# Patient Record
Sex: FEMALE | Race: WHITE | NOT HISPANIC OR LATINO | Employment: UNEMPLOYED | URBAN - METROPOLITAN AREA
[De-identification: names, ages, dates, MRNs, and addresses within clinical notes are randomized per-mention and may not be internally consistent; named-entity substitution may affect disease eponyms.]

---

## 2019-11-03 ENCOUNTER — HOSPITAL ENCOUNTER (EMERGENCY)
Facility: MEDICAL CENTER | Age: 34
End: 2019-11-03
Attending: EMERGENCY MEDICINE
Payer: MEDICAID

## 2019-11-03 ENCOUNTER — APPOINTMENT (OUTPATIENT)
Dept: RADIOLOGY | Facility: MEDICAL CENTER | Age: 34
End: 2019-11-03
Attending: EMERGENCY MEDICINE
Payer: MEDICAID

## 2019-11-03 VITALS
HEIGHT: 62 IN | TEMPERATURE: 96.8 F | SYSTOLIC BLOOD PRESSURE: 125 MMHG | WEIGHT: 149.03 LBS | RESPIRATION RATE: 16 BRPM | OXYGEN SATURATION: 99 % | DIASTOLIC BLOOD PRESSURE: 79 MMHG | BODY MASS INDEX: 27.43 KG/M2 | HEART RATE: 76 BPM

## 2019-11-03 DIAGNOSIS — R07.89 CHEST WALL PAIN: ICD-10-CM

## 2019-11-03 PROCEDURE — A9270 NON-COVERED ITEM OR SERVICE: HCPCS | Performed by: EMERGENCY MEDICINE

## 2019-11-03 PROCEDURE — 700102 HCHG RX REV CODE 250 W/ 637 OVERRIDE(OP): Performed by: EMERGENCY MEDICINE

## 2019-11-03 PROCEDURE — 71101 X-RAY EXAM UNILAT RIBS/CHEST: CPT | Mod: LT

## 2019-11-03 PROCEDURE — 99284 EMERGENCY DEPT VISIT MOD MDM: CPT

## 2019-11-03 RX ORDER — OXYCODONE HYDROCHLORIDE AND ACETAMINOPHEN 5; 325 MG/1; MG/1
2 TABLET ORAL EVERY 6 HOURS PRN
Qty: 13 TAB | Refills: 0 | Status: SHIPPED | OUTPATIENT
Start: 2019-11-03 | End: 2019-11-06

## 2019-11-03 RX ORDER — PROMETHAZINE HYDROCHLORIDE 25 MG/1
25 TABLET ORAL EVERY 6 HOURS PRN
COMMUNITY

## 2019-11-03 RX ORDER — OXYCODONE HYDROCHLORIDE AND ACETAMINOPHEN 5; 325 MG/1; MG/1
2 TABLET ORAL ONCE
Status: COMPLETED | OUTPATIENT
Start: 2019-11-03 | End: 2019-11-03

## 2019-11-03 RX ADMIN — OXYCODONE HYDROCHLORIDE AND ACETAMINOPHEN 2 TABLET: 5; 325 TABLET ORAL at 17:04

## 2019-11-03 NOTE — ED TRIAGE NOTES
"Mari Celaya  Chief Complaint   Patient presents with   • Rib Pain     left side,  increased with inspiration and coughing     Pt ambulatory to triage with above complaint.  Elevated HR noted, per pt \"it's always high\", no acute distress.    Pt states left upper rib pain x 1 week, recently with \"bad cough\", and  Yesterday leaned over and \"felt a pop\", and has had increased pain.     Pt returned to lobby, educated on triage process, and to inform staff of any changes or concerns.     "

## 2019-11-04 NOTE — ED PROVIDER NOTES
ED Provider Note    CHIEF COMPLAINT  Chief Complaint   Patient presents with   • Rib Pain     left side,  increased with inspiration and coughing       HPI  Mari Celaya is a 34 y.o. female who presents with a chief complaint of rib pain is on the left side.  Duration has been for about 3 weeks got worse the last week and then yesterday while she is cleaning her up and she felt a sudden pop with worsening pain is about 9 attendance on the left side right above the left rib it is nonradiating so she takes deep breath no associated fever no chills she had a cough.    Patient states that she is has a history of celiac disease only taken Tylenol.  She states she has been taking it for pain.  Patient states that she is also starting a new job tomorrow and that she is unsure if she can work for her job.  She also goes to nighttime school.  She states that she been using her arms during school and this makes it difficult    REVIEW OF SYSTEMS  General: No fever or chills.  Eyes: No eye discharge. No eye pain.  Ear nose throat: No sore throat or  trouble swallowing.  Pulmonary: No shortness of breath or cough.  Cardiovascular: See above        PAST MEDICAL HISTORY  Past Medical History:   Diagnosis Date   • Anxiety    • Celiac disease        FAMILY HISTORY  History reviewed. No pertinent family history.    SOCIAL HISTORY  Social History     Socioeconomic History   • Marital status: Single     Spouse name: Not on file   • Number of children: Not on file   • Years of education: Not on file   • Highest education level: Not on file   Occupational History   • Not on file   Social Needs   • Financial resource strain: Not on file   • Food insecurity:     Worry: Not on file     Inability: Not on file   • Transportation needs:     Medical: Not on file     Non-medical: Not on file   Tobacco Use   • Smoking status: Current Every Day Smoker     Packs/day: 0.50     Types: Cigarettes   • Smokeless tobacco: Never Used   Substance and  "Sexual Activity   • 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   • Drug use: No   • Sexual activity: Not on file   Lifestyle   • Physical activity:     Days per week: Not on file     Minutes per session: Not on file   • Stress: Not on file   Relationships   • Social connections:     Talks on phone: Not on file     Gets together: Not on file     Attends Mandaeism service: Not on file     Active member of club or organization: Not on file     Attends meetings of clubs or organizations: Not on file     Relationship status: Not on file   • Intimate partner violence:     Fear of current or ex partner: Not on file     Emotionally abused: Not on file     Physically abused: Not on file     Forced sexual activity: Not on file   Other Topics Concern   • Not on file   Social History Narrative   • Not on file       SURGICAL HISTORY  History reviewed. No pertinent surgical history.    CURRENT MEDICATIONS  Home Medications     Reviewed by Cindy Arriola R.N. (Registered Nurse) on 11/03/19 at 1533  Med List Status: Partial   Medication Last Dose Status   omeprazole (PRILOSEC) 20 MG delayed-release capsule  Active   omeprazole (PRILOSEC) 20 MG delayed-release capsule  Active   oxyCODONE CR (OXYCONTIN) 10 MG T12A  Active   ranitidine (ZANTAC) 150 MG TABS  Active   ranitidine (ZANTAC) 300 MG tablet  Active                ALLERGIES  Allergies   Allergen Reactions   • Pertussis Vaccines        PHYSICAL EXAM  VITAL SIGNS: /81   Pulse (!) 115   Temp 36 °C (96.8 °F) (Temporal)   Resp 18   Ht 1.575 m (5' 2\")   Wt 67.6 kg (149 lb 0.5 oz)   SpO2 99%   BMI 27.26 kg/m²    Constitutional: Well developed, Well nourished, no acute distress,   HENT: Normocephalic, Atraumatic, Oropharynx moist, No oral exudates, Nose normal.   Eyes: PERRLA, EOMI, Conjunctiva normal, No discharge.   Musculoskeletal: Patient has tenderness over the left ribs around rib 10 and 11.  Is slightly swollen more than the other.  Cardiovascular: " Get a rhythm rate of 80 no murmurs  Thorax & Lungs: Lungs are clear bilaterally no wheezes no rales  Abdomen: Bowel sounds normal, Soft, No tenderness, No masses, No pulsatile masses.   Skin: Warm, Dry, No erythema, No rash.   : No CVA tenderness.   Neurologic: Alert & oriented , moves all extremities equally  Psychiatric:  Calm, not anxious        RADIOLOGY/PROCEDURES  Trace pending.    COURSE & MEDICAL DECISION MAKING  Pertinent Labs & Imaging studies reviewed. (See chart for details)  Most likely a rib subluxation.  Patient get an x-ray to rule out fracture.  At this point we discussed narcotic use patient gets short course of narcotics for pain    At this point the patient had screening done in addition the patient was told not to drive and drink cannot take any alcohol.  Patient get a 3-day course which this was explained to her in detail.    At this point my partner follow-up the x-ray of the x-rays negative for fracture she will get a note for no work for the next 2 to 3 days as she will take about a week for it to get better told to avoid strenuous activity to further worsen subluxation.  In addition she can follow-up with her primary care doctor and for further work-up if needed.  She can try pain management and possible nerve block if he continues to get worse    FINAL IMPRESSION  1.  Left-sided rib pain  2.   3.      Electronically signed by: Archie Norton, 11/3/2019 5:02 PM

## 2019-11-04 NOTE — ED PROVIDER NOTES
ED follow-up note:  I was asked by the oncoming physician to follow-up with the patient's chest x-ray.  See the H&P for full history.  She is otherwise well-appearing, has had isolated left-sided rib tenderness and was being treated symptomatically for possible rib discomfort versus fracture versus subluxation.  At the time my evaluation she is remaining stable, has reassuring vital signs, and x-ray shows no acute abnormalities.  Patient is updated regarding these findings, she has a plan for follow-up in outpatient setting, symptomatic medications as prescribed by the previous provider is discharged home in stable condition

## 2020-01-22 ENCOUNTER — HOSPITAL ENCOUNTER (EMERGENCY)
Facility: MEDICAL CENTER | Age: 35
End: 2020-01-22
Attending: EMERGENCY MEDICINE
Payer: MEDICAID

## 2020-01-22 VITALS
RESPIRATION RATE: 18 BRPM | SYSTOLIC BLOOD PRESSURE: 119 MMHG | WEIGHT: 159.83 LBS | HEIGHT: 62 IN | TEMPERATURE: 97.3 F | DIASTOLIC BLOOD PRESSURE: 84 MMHG | HEART RATE: 110 BPM | OXYGEN SATURATION: 97 % | BODY MASS INDEX: 29.41 KG/M2

## 2020-01-22 DIAGNOSIS — J01.10 ACUTE FRONTAL SINUSITIS, RECURRENCE NOT SPECIFIED: ICD-10-CM

## 2020-01-22 PROCEDURE — 99282 EMERGENCY DEPT VISIT SF MDM: CPT

## 2020-01-22 RX ORDER — AMOXICILLIN AND CLAVULANATE POTASSIUM 875; 125 MG/1; MG/1
1 TABLET, FILM COATED ORAL 2 TIMES DAILY
Qty: 10 TAB | Refills: 0 | Status: SHIPPED | OUTPATIENT
Start: 2020-01-22 | End: 2020-01-27

## 2020-01-22 ASSESSMENT — LIFESTYLE VARIABLES: DO YOU DRINK ALCOHOL: NO

## 2020-01-22 NOTE — ED TRIAGE NOTES
Chief Complaint   Patient presents with   • Nasal Congestion     Pt reports symptoms for 6 days, recent visit to [facility] and pt taking steroid/nasal spray and cough medicine w/o relief.    • Facial Pain     Explained to pt triage process, made pt aware to tell this RN/staff of any changes/concerns, pt verbalized understanding of process and instructions given. Pt to ER vanessa.

## 2022-08-01 NOTE — ED PROVIDER NOTES
ED Provider Note    Scribed for Philipp Kong M.D. by Roro Fam. 2020  10:13 AM    Primary care provider: Mohsen Tamasaby, M.D.  Means of arrival: walk in   History obtained from: Patient  History limited by: None    CHIEF COMPLAINT  Chief Complaint   Patient presents with   • Nasal Congestion     Pt reports symptoms for 6 days, recent visit to [facility] and pt taking steroid/nasal spray and cough medicine w/o relief.    • Facial Pain       HPI  Mari Celaya is a 34 y.o. female who presents to the Emergency Department for evaluation of nasal congestion onset 6 days ago. Patient states that she had visited Urgent Care at the time of symptom onset,and was given steroid and nasal spray to alleviate her symptoms. She reports associated symptoms of frontal sinus pain, maxially sinus pain, and congestion. Patient notes that has taken doses of prednisone, Flonase and OTC cough syrup with no alleviation. She denies any fevers or cough.     REVIEW OF SYSTEMS  Pertinent positives include congestion, frontal sinus pain, and maxillary sinus pain. Pertinent negatives include no fevers or cough.      PAST MEDICAL HISTORY   has a past medical history of Anxiety and Celiac disease.    SURGICAL HISTORY  None.     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     CURRENT MEDICATIONS  No current facility-administered medications for this encounter.     Current Outpatient Medications:   •  promethazine (PHENERGAN) 25 MG Tab, Take 25 mg by mouth every 6 hours as needed for Nausea/Vomiting., Disp: , Rfl:   •  omeprazole (PRILOSEC) 20 MG delayed-release  "capsule, Take 40 mg by mouth every day., Disp: , Rfl:      ALLERGIES  Allergies   Allergen Reactions   • Pertussis Vaccines        PHYSICAL EXAM  VITAL SIGNS: /84   Pulse (!) 110   Temp 36.3 °C (97.3 °F) (Temporal)   Resp 18   Ht 1.575 m (5' 2\")   Wt 72.5 kg (159 lb 13.3 oz)   SpO2 97%   BMI 29.23 kg/m²     Constitutional: Well developed, Well nourished, no apparent distress, Non-toxic appearance.   HENT: Normocephalic, Atraumatic.  Oropharynx moist but has posterior nasal drainage. Fluid behind bilateral TM's. Pressure on frontal sinus.  Eyes: PERRL, EOMI, Conjunctiva normal, No discharge.   CV: Good pulses  Thorax & Lungs: No respiratory distress.   Skin: Warm, Dry, No erythema, No rash.    Musculoskeletal: No major deformities noted.   Neurologic: Awake, alert. Moves all extremities spontaneously.  Psychiatric: Affect normal, Mood normal.      COURSE & MEDICAL DECISION MAKING  Nursing notes, VS, PMSFHx reviewed in chart.    10:13 AM - Patient seen and examined at bedside. Given the patient's symptomatology, the likelihood of a viral illness is high. Patient understands that the immune system is built to clear this type of infection.The mainstay of therapy for viral infections is copious fluids, rest, fever control and frequent hand washing to avoid spread of the illness. Tylenol and motrin administration are recommended for fever and pain control. Patient is to return to the ED if her symptoms worsen. Patient verbalizes understanding and agrees to discharge home.     Decision Making:  Patient with facial pain, likely URI type symptoms, she has been using steroid nasal spray and decongestants without any improvement of her symptoms, discussed with her that I felt like it was likely a virus however I will get the patient a prescription for antibiotics, discussed with her to try over-the-counter remedies for her symptoms, then start antibiotics without any improvement.  Return with worsening " symptoms.     The patient will return for new or worsening symptoms and is stable at the time of discharge.    The patient is referred to a primary physician for blood pressure management, diabetic screening, and for all other preventative health concerns.      DISPOSITION:  Patient will be discharged home in stable condition.    FOLLOW UP:  [facility], Emergency Dept  [address] [facility] [id number] [phone]    If symptoms worsen      OUTPATIENT MEDICATIONS:  Discharge Medication List as of 1/22/2020 10:25 AM      START taking these medications    Details   amoxicillin-clavulanate (AUGMENTIN) 875-125 MG Tab Take 1 Tab by mouth 2 times a day for 5 days., Disp-10 Tab, R-0, Print Rx Paper               HTN/IDDM FOLLOW UP:  The patient is referred to a primary physician for blood pressure management, diabetic screening, and for all other preventive health concerns    FINAL IMPRESSION  1. Acute frontal sinusitis, recurrence not specified          Roro LUNDBERG (Scribosman), am scribing for, and in the presence of, Philipp Kong M.D..    Electronically signed by: Roro Fam (Walt), 1/22/2020    IPhilipp M.D. personally performed the services described in this documentation, as scribed by Roro Fam in my presence, and it is both accurate and complete. E    The note accurately reflects work and decisions made by me.  Philipp Kong M.D.  1/22/2020  4:41 PM             Normal for race